# Patient Record
Sex: FEMALE | Race: BLACK OR AFRICAN AMERICAN | Employment: FULL TIME | URBAN - METROPOLITAN AREA
[De-identification: names, ages, dates, MRNs, and addresses within clinical notes are randomized per-mention and may not be internally consistent; named-entity substitution may affect disease eponyms.]

---

## 2018-08-23 PROBLEM — O24.919 DIABETES IN PREGNANCY: Status: ACTIVE | Noted: 2018-08-23

## 2018-08-23 PROBLEM — Z34.90 ENCOUNTER FOR PLANNED INDUCTION OF LABOR: Status: ACTIVE | Noted: 2018-08-23

## 2018-08-27 ENCOUNTER — TELEPHONE (OUTPATIENT)
Dept: SURGERY | Age: 28
End: 2018-08-27

## 2018-08-27 NOTE — TELEPHONE ENCOUNTER
Pt lm regarding sending a rx for percocet to her pharmacy called pt back and no answer but left her a message that percocet needed to be a hand written rx and to call us back so we could assisst her